# Patient Record
Sex: MALE | Race: WHITE | NOT HISPANIC OR LATINO | Employment: FULL TIME | URBAN - METROPOLITAN AREA
[De-identification: names, ages, dates, MRNs, and addresses within clinical notes are randomized per-mention and may not be internally consistent; named-entity substitution may affect disease eponyms.]

---

## 2018-04-07 ENCOUNTER — HOSPITAL ENCOUNTER (EMERGENCY)
Facility: HOSPITAL | Age: 28
Discharge: HOME | End: 2018-04-07
Attending: EMERGENCY MEDICINE | Admitting: EMERGENCY MEDICINE

## 2018-04-07 ENCOUNTER — APPOINTMENT (OUTPATIENT)
Dept: GENERAL RADIOLOGY | Facility: HOSPITAL | Age: 28
End: 2018-04-07

## 2018-04-07 VITALS
HEIGHT: 75 IN | BODY MASS INDEX: 27.98 KG/M2 | HEART RATE: 60 BPM | WEIGHT: 225 LBS | OXYGEN SATURATION: 97 % | SYSTOLIC BLOOD PRESSURE: 113 MMHG | TEMPERATURE: 98.3 F | DIASTOLIC BLOOD PRESSURE: 76 MMHG | RESPIRATION RATE: 18 BRPM

## 2018-04-07 DIAGNOSIS — R07.9 LEFT SIDED CHEST PAIN: ICD-10-CM

## 2018-04-07 DIAGNOSIS — R09.1 VIRAL PLEURISY: Primary | ICD-10-CM

## 2018-04-07 LAB
ALBUMIN SERPL-MCNC: 4.6 G/DL (ref 3.2–4.8)
ALBUMIN/GLOB SERPL: 1.4 G/DL (ref 1.5–2.5)
ALP SERPL-CCNC: 52 U/L (ref 25–100)
ALT SERPL W P-5'-P-CCNC: 22 U/L (ref 7–40)
ANION GAP SERPL CALCULATED.3IONS-SCNC: 5 MMOL/L (ref 3–11)
AST SERPL-CCNC: 25 U/L (ref 0–33)
BASOPHILS # BLD AUTO: 0.01 10*3/MM3 (ref 0–0.2)
BASOPHILS NFR BLD AUTO: 0.1 % (ref 0–1)
BILIRUB SERPL-MCNC: 1.2 MG/DL (ref 0.3–1.2)
BNP SERPL-MCNC: 20 PG/ML (ref 0–100)
BUN BLD-MCNC: 13 MG/DL (ref 9–23)
BUN/CREAT SERPL: 10.8 (ref 7–25)
CALCIUM SPEC-SCNC: 9.3 MG/DL (ref 8.7–10.4)
CHLORIDE SERPL-SCNC: 105 MMOL/L (ref 99–109)
CO2 SERPL-SCNC: 30 MMOL/L (ref 20–31)
CREAT BLD-MCNC: 1.2 MG/DL (ref 0.6–1.3)
D DIMER PPP FEU-MCNC: 0.26 MG/L (FEU) (ref 0–0.5)
DEPRECATED RDW RBC AUTO: 39 FL (ref 37–54)
EOSINOPHIL # BLD AUTO: 0.07 10*3/MM3 (ref 0–0.3)
EOSINOPHIL NFR BLD AUTO: 0.9 % (ref 0–3)
ERYTHROCYTE [DISTWIDTH] IN BLOOD BY AUTOMATED COUNT: 12.3 % (ref 11.3–14.5)
GFR SERPL CREATININE-BSD FRML MDRD: 72 ML/MIN/1.73
GLOBULIN UR ELPH-MCNC: 3.2 GM/DL
GLUCOSE BLD-MCNC: 91 MG/DL (ref 70–100)
HCT VFR BLD AUTO: 43.3 % (ref 38.9–50.9)
HGB BLD-MCNC: 14.7 G/DL (ref 13.1–17.5)
IMM GRANULOCYTES # BLD: 0.02 10*3/MM3 (ref 0–0.03)
IMM GRANULOCYTES NFR BLD: 0.3 % (ref 0–0.6)
LYMPHOCYTES # BLD AUTO: 2.52 10*3/MM3 (ref 0.6–4.8)
LYMPHOCYTES NFR BLD AUTO: 31.6 % (ref 24–44)
MCH RBC QN AUTO: 29.4 PG (ref 27–31)
MCHC RBC AUTO-ENTMCNC: 33.9 G/DL (ref 32–36)
MCV RBC AUTO: 86.6 FL (ref 80–99)
MONOCYTES # BLD AUTO: 0.75 10*3/MM3 (ref 0–1)
MONOCYTES NFR BLD AUTO: 9.4 % (ref 0–12)
NEUTROPHILS # BLD AUTO: 4.61 10*3/MM3 (ref 1.5–8.3)
NEUTROPHILS NFR BLD AUTO: 57.7 % (ref 41–71)
PLATELET # BLD AUTO: 240 10*3/MM3 (ref 150–450)
PMV BLD AUTO: 9.3 FL (ref 6–12)
POTASSIUM BLD-SCNC: 3.8 MMOL/L (ref 3.5–5.5)
PROT SERPL-MCNC: 7.8 G/DL (ref 5.7–8.2)
RBC # BLD AUTO: 5 10*6/MM3 (ref 4.2–5.76)
SODIUM BLD-SCNC: 140 MMOL/L (ref 132–146)
TROPONIN I SERPL-MCNC: 0 NG/ML (ref 0–0.07)
WBC NRBC COR # BLD: 7.98 10*3/MM3 (ref 3.5–10.8)

## 2018-04-07 PROCEDURE — 80053 COMPREHEN METABOLIC PANEL: CPT | Performed by: EMERGENCY MEDICINE

## 2018-04-07 PROCEDURE — 85379 FIBRIN DEGRADATION QUANT: CPT | Performed by: EMERGENCY MEDICINE

## 2018-04-07 PROCEDURE — 99283 EMERGENCY DEPT VISIT LOW MDM: CPT

## 2018-04-07 PROCEDURE — 83880 ASSAY OF NATRIURETIC PEPTIDE: CPT | Performed by: EMERGENCY MEDICINE

## 2018-04-07 PROCEDURE — 84484 ASSAY OF TROPONIN QUANT: CPT

## 2018-04-07 PROCEDURE — 71046 X-RAY EXAM CHEST 2 VIEWS: CPT

## 2018-04-07 PROCEDURE — 85025 COMPLETE CBC W/AUTO DIFF WBC: CPT | Performed by: EMERGENCY MEDICINE

## 2018-04-07 PROCEDURE — 93005 ELECTROCARDIOGRAM TRACING: CPT | Performed by: EMERGENCY MEDICINE

## 2018-04-07 RX ORDER — NAPROXEN 500 MG/1
500 TABLET ORAL 2 TIMES DAILY WITH MEALS
Qty: 14 TABLET | Refills: 0 | Status: SHIPPED | OUTPATIENT
Start: 2018-04-07

## 2018-04-07 RX ORDER — ALBUTEROL SULFATE 90 UG/1
2 AEROSOL, METERED RESPIRATORY (INHALATION) 4 TIMES DAILY
Qty: 1 INHALER | Refills: 0 | Status: SHIPPED | OUTPATIENT
Start: 2018-04-07

## 2018-04-07 RX ORDER — SODIUM CHLORIDE 0.9 % (FLUSH) 0.9 %
10 SYRINGE (ML) INJECTION AS NEEDED
Status: DISCONTINUED | OUTPATIENT
Start: 2018-04-07 | End: 2018-04-07 | Stop reason: HOSPADM

## 2018-04-07 RX ORDER — PREDNISONE 20 MG/1
20 TABLET ORAL DAILY
Qty: 5 TABLET | Refills: 0 | Status: SHIPPED | OUTPATIENT
Start: 2018-04-07

## 2018-04-07 NOTE — ED PROVIDER NOTES
Subjective   Mr. Ryanne Yen is a 27 y.o. male who presents to the ED with c/o CP with onset around a week ago. He reports that for the last week he has been having sporadic episodes of left chest tightness as well as an episode of left arm numbness last night, which prompted presentation to the ED. He notes that deep breathing occasionally relieves his pain. He denies a recent fever, leg swelling, and leg pain. He has a family hx of his mother having a hole her heart, which had to be repaired. No other acute complaints at this time.        History provided by:  Patient  Chest Pain   Pain location:  L chest  Pain quality: tightness    Pain severity:  Mild  Onset quality:  Gradual  Duration:  1 week  Timing:  Sporadic  Progression:  Unchanged  Chronicity:  New  Relieved by: Deep breathing.  Associated symptoms: numbness (Left arm)    Associated symptoms: no fever        Review of Systems   Constitutional: Negative for fever.   Cardiovascular: Positive for chest pain. Negative for leg swelling.   Neurological: Positive for numbness (Left arm).   All other systems reviewed and are negative.      History reviewed. No pertinent past medical history.    No Known Allergies    Past Surgical History:   Procedure Laterality Date   • ANKLE SURGERY     • HAND SURGERY     • KNEE SURGERY         History reviewed. No pertinent family history.    Social History     Social History   • Marital status: Single     Social History Main Topics   • Smoking status: Never Smoker   • Alcohol use No   • Drug use: No     Other Topics Concern   • Not on file         Objective   Physical Exam   Constitutional: He is oriented to person, place, and time. He appears well-developed and well-nourished. No distress.   HENT:   Head: Normocephalic and atraumatic.   Nose: Nose normal.   Eyes: Conjunctivae are normal. No scleral icterus.   Neck: Normal range of motion. Neck supple.   Cardiovascular: Normal rate, regular rhythm and normal heart sounds.     No murmur heard.  Pulmonary/Chest: Effort normal and breath sounds normal. No respiratory distress.   Musculoskeletal: Normal range of motion. He exhibits no edema or tenderness.   Negative DVT exam   Neurological: He is alert and oriented to person, place, and time.   Skin: Skin is warm and dry. He is not diaphoretic.   Psychiatric: He has a normal mood and affect. His behavior is normal.   Nursing note and vitals reviewed.      Procedures         ED Course  ED Course   Value Comment By Time   Troponin I: 0.00 (Reviewed) Felipe Cesar MD 04/07 1811   D-dimer, Quant: 0.26 (Reviewed) Felipe Cesar MD 04/07 1819   BNP: 20.0 (Reviewed) Felipe Cesar MD 04/07 1819    Patient is imaging study suggesting viral etiology for his symptoms.  Patient's symptoms most consistent with Santos.  Discharge her symptomatically management follow-up with his doctor symptoms persist with return to the ER for any worsening concerns.  He voices understanding and agrees. Felipe Cesar MD 04/07 1819     Recent Results (from the past 24 hour(s))   D-dimer, Quantitative    Collection Time: 04/07/18  5:39 PM   Result Value Ref Range    D-Dimer, Quantitative 0.26 0.00 - 0.50 mg/L (FEU)   BNP    Collection Time: 04/07/18  5:39 PM   Result Value Ref Range    BNP 20.0 0.0 - 100.0 pg/mL   Comprehensive Metabolic Panel    Collection Time: 04/07/18  5:39 PM   Result Value Ref Range    Glucose 91 70 - 100 mg/dL    BUN 13 9 - 23 mg/dL    Creatinine 1.20 0.60 - 1.30 mg/dL    Sodium 140 132 - 146 mmol/L    Potassium 3.8 3.5 - 5.5 mmol/L    Chloride 105 99 - 109 mmol/L    CO2 30.0 20.0 - 31.0 mmol/L    Calcium 9.3 8.7 - 10.4 mg/dL    Total Protein 7.8 5.7 - 8.2 g/dL    Albumin 4.60 3.20 - 4.80 g/dL    ALT (SGPT) 22 7 - 40 U/L    AST (SGOT) 25 0 - 33 U/L    Alkaline Phosphatase 52 25 - 100 U/L    Total Bilirubin 1.2 0.3 - 1.2 mg/dL    eGFR Non African Amer 72 >60 mL/min/1.73    Globulin 3.2 gm/dL    A/G Ratio 1.4 (L) 1.5 - 2.5  g/dL    BUN/Creatinine Ratio 10.8 7.0 - 25.0    Anion Gap 5.0 3.0 - 11.0 mmol/L   CBC Auto Differential    Collection Time: 04/07/18  5:39 PM   Result Value Ref Range    WBC 7.98 3.50 - 10.80 10*3/mm3    RBC 5.00 4.20 - 5.76 10*6/mm3    Hemoglobin 14.7 13.1 - 17.5 g/dL    Hematocrit 43.3 38.9 - 50.9 %    MCV 86.6 80.0 - 99.0 fL    MCH 29.4 27.0 - 31.0 pg    MCHC 33.9 32.0 - 36.0 g/dL    RDW 12.3 11.3 - 14.5 %    RDW-SD 39.0 37.0 - 54.0 fl    MPV 9.3 6.0 - 12.0 fL    Platelets 240 150 - 450 10*3/mm3    Neutrophil % 57.7 41.0 - 71.0 %    Lymphocyte % 31.6 24.0 - 44.0 %    Monocyte % 9.4 0.0 - 12.0 %    Eosinophil % 0.9 0.0 - 3.0 %    Basophil % 0.1 0.0 - 1.0 %    Immature Grans % 0.3 0.0 - 0.6 %    Neutrophils, Absolute 4.61 1.50 - 8.30 10*3/mm3    Lymphocytes, Absolute 2.52 0.60 - 4.80 10*3/mm3    Monocytes, Absolute 0.75 0.00 - 1.00 10*3/mm3    Eosinophils, Absolute 0.07 0.00 - 0.30 10*3/mm3    Basophils, Absolute 0.01 0.00 - 0.20 10*3/mm3    Immature Grans, Absolute 0.02 0.00 - 0.03 10*3/mm3   POC Troponin, Rapid    Collection Time: 04/07/18  5:43 PM   Result Value Ref Range    Troponin I 0.00 0.00 - 0.07 ng/mL     Note: In addition to lab results from this visit, the labs listed above may include labs taken at another facility or during a different encounter within the last 24 hours. Please correlate lab times with ED admission and discharge times for further clarification of the services performed during this visit.    XR Chest 2 View   Preliminary Result   Mild prominence of the perihilar lung markings may represent   peribronchial inflammatory process such as bronchitis or reactive   airways disease, however, no focal consolidation to suggest acute   pneumonia.       DICTATED:     04/07/2018   EDITED/ls :     04/07/2018             Vitals:    04/07/18 1722 04/07/18 1758   BP: 129/75 113/76   BP Location: Right arm    Patient Position: Sitting    Pulse: 69 60   Resp: 18 18   Temp: 98.3 °F (36.8 °C)    TempSrc:  "Oral    SpO2: 97% 97%   Weight: 102 kg (225 lb)    Height: 190.5 cm (75\")      Medications - No data to display  ECG/EMG Results (last 24 hours)     Procedure Component Value Units Date/Time    ECG 12 Lead [[id number]] Collected:  04/07/18 1712     Updated:  04/07/18 1714                    HEART Score (for prediction of 6-week risk of major adverse cardiac event) reviewed and/or performed as part of the patient evaluation and treatment planning process.  The result associated with this review/performance is: 0           MDM  Number of Diagnoses or Management Options  Left sided chest pain:   Viral pleurisy:   Diagnosis management comments: ECG/EMG Results (last 24 hours)     ** No results found for the last 24 hours. **             Amount and/or Complexity of Data Reviewed  Clinical lab tests: reviewed  Tests in the radiology section of CPT®: reviewed  Independent visualization of images, tracings, or specimens: yes        Final diagnoses:   Viral pleurisy   Left sided chest pain       Documentation assistance provided by trung Sorto.  Information recorded by the scribe was done at my direction and has been verified and validated by me.     Siena Sorto  04/07/18 1800       Siena Sorto  04/07/18 1824       Felipe Cesar MD  04/08/18 0030    "